# Patient Record
Sex: MALE | Race: WHITE | NOT HISPANIC OR LATINO | URBAN - METROPOLITAN AREA
[De-identification: names, ages, dates, MRNs, and addresses within clinical notes are randomized per-mention and may not be internally consistent; named-entity substitution may affect disease eponyms.]

---

## 2020-01-27 ENCOUNTER — OFFICE VISIT (OUTPATIENT)
Dept: OPHTHALMOLOGY | Facility: CLINIC | Age: 70
End: 2020-01-27
Attending: OPHTHALMOLOGY
Payer: COMMERCIAL

## 2020-01-27 DIAGNOSIS — H21.233 PIGMENT DISPERSION SYNDROME OF BOTH EYES: Primary | ICD-10-CM

## 2020-01-27 PROCEDURE — G0463 HOSPITAL OUTPT CLINIC VISIT: HCPCS | Mod: ZF

## 2020-01-27 PROCEDURE — 92015 DETERMINE REFRACTIVE STATE: CPT | Mod: GY,ZF

## 2020-01-27 PROCEDURE — 92083 EXTENDED VISUAL FIELD XM: CPT | Mod: ZF | Performed by: OPHTHALMOLOGY

## 2020-01-27 PROCEDURE — 92133 CPTRZD OPH DX IMG PST SGM ON: CPT | Mod: ZF | Performed by: OPHTHALMOLOGY

## 2020-01-27 RX ORDER — GABAPENTIN 300 MG/1
1 CAPSULE ORAL EVERY EVENING
COMMUNITY
Start: 2020-01-13

## 2020-01-27 ASSESSMENT — TONOMETRY
IOP_METHOD: APPLANATION
OS_IOP_MMHG: 18
OD_IOP_MMHG: 15

## 2020-01-27 ASSESSMENT — REFRACTION_MANIFEST
OS_CYLINDER: +0.25
OS_ADD: +2.50
OD_SPHERE: -1.50
OD_AXIS: 167
OS_SPHERE: -1.75
OD_ADD: +2.50
OS_AXIS: 040
OD_CYLINDER: +0.50

## 2020-01-27 ASSESSMENT — CONF VISUAL FIELD
OS_NORMAL: 1
OD_NORMAL: 1
METHOD: COUNTING FINGERS

## 2020-01-27 ASSESSMENT — VISUAL ACUITY
OD_CC+: +1
OD_CC: 20/40
OD_PH_CC: 20/25
METHOD: SNELLEN - LINEAR
OD_PH_CC+: -2
CORRECTION_TYPE: GLASSES
OS_CC: 20/20
OS_CC+: -2

## 2020-01-27 ASSESSMENT — SLIT LAMP EXAM - LIDS
COMMENTS: NORMAL
COMMENTS: NORMAL

## 2020-01-27 ASSESSMENT — REFRACTION_WEARINGRX
OD_SPHERE: -2.25
OS_SPHERE: -1.75
SPECS_TYPE: SVL
OS_ADD: +2.25
OD_ADD: +2.25
OS_CYLINDER: SPHERE
OD_CYLINDER: SPHERE

## 2020-01-27 ASSESSMENT — CUP TO DISC RATIO
OS_RATIO: 0.2
OD_RATIO: 0.3

## 2020-01-27 NOTE — NURSING NOTE
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  Complains of occasional blurriness BE.  Complains of occasional itchy sensation BE.  Denies any pain, pressure, discharge, and tearing.  Currently using Opcon-A PRN BE.    Jeanette Liang OT 9:15 AM January 27, 2020

## 2020-01-27 NOTE — PROGRESS NOTES
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      OCT normal. Initial GTOP both eyes, high false pos right eye, left eye with better reliability but paracentral decreased sensitivity.     RV 1 year   GTOP visual field and OCT retinal nerve fiber layer and dilation  Prescription for glasses per manifest refraction- updated Rx given    Attending Physician Attestation:  Complete documentation of historical and exam elements from today's encounter can be found in the full encounter summary report (not reduplicated in this progress note). I personally obtained the chief complaint(s) and history of present illness. I confirmed and edited asnecessary the review of systems, past medical/surgical history, family history, social history, and examination findings as documented by others; and I examined the patient myself. I personally reviewed the relevant tests, images, and reports as documented above. I formulated and edited as necessary the assessment and plan and discussed the findings and management plan with the patient and family.  - Salena Luna MD 11:01 AM 1/27/2020

## 2021-01-28 ENCOUNTER — TELEPHONE (OUTPATIENT)
Dept: OPHTHALMOLOGY | Facility: CLINIC | Age: 71
End: 2021-01-28

## 2022-11-17 DIAGNOSIS — H21.233 PIGMENT DISPERSION SYNDROME OF BOTH EYES: Primary | ICD-10-CM

## 2023-05-24 DIAGNOSIS — H21.233 PIGMENT DISPERSION SYNDROME OF BOTH EYES: Primary | ICD-10-CM

## 2023-05-24 DIAGNOSIS — H40.89 OTHER SPECIFIED GLAUCOMA: ICD-10-CM

## 2023-05-24 DIAGNOSIS — H53.8 OTHER VISUAL DISTURBANCES: ICD-10-CM

## 2023-05-24 DIAGNOSIS — H40.89 OTHER SPECIFIED GLAUCOMA, UNSPECIFIED LATERALITY: ICD-10-CM

## 2023-05-24 DIAGNOSIS — H53.453 OTHER LOCALIZED VISUAL FIELD DEFECT, BILATERAL: ICD-10-CM

## 2023-05-24 PROCEDURE — 99207 PACHYMETRY OU (BOTH EYES): CPT | Performed by: OPHTHALMOLOGY

## 2023-10-03 DIAGNOSIS — H40.89 OTHER SPECIFIED GLAUCOMA, UNSPECIFIED LATERALITY: ICD-10-CM

## 2023-10-03 DIAGNOSIS — H21.233 PIGMENT DISPERSION SYNDROME OF BOTH EYES: Primary | ICD-10-CM

## 2024-05-20 DIAGNOSIS — H21.233 PIGMENT DISPERSION SYNDROME OF BOTH EYES: Primary | ICD-10-CM

## 2024-05-20 DIAGNOSIS — H40.89 OTHER SPECIFIED GLAUCOMA, UNSPECIFIED LATERALITY: ICD-10-CM

## 2024-05-20 NOTE — PROGRESS NOTES
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 Previously followed with Dr. Luna. Last seen in January 2020. VF and RNFL were normal and he was being monitored with no glaucoma drops.     Patient states that he is interested in a comprehensive eye exam today. No specific concerns. No eye pain and not using any eye drops.     Drops on initial evaluation:    Relevant Past Medical/Family/Social History:  Crohns disease  hx basal cell carcinoma, insomnia  restless legs syndrome  Osteoarthritis  peripheral neuropathy     Relevant Review of Systems: none     Diagnosis: Pigmentary glaucoma , both eyes  Year diagnosis: 2015  Previous glaucoma surgery/laser: none  Maximum intraocular pressure 25/23 (5/21/24)  Current meds: see below  Family history: negative  CCT: 558/549  Gonio: open to ciliary body in all 4 quadrants in both eyes, Sampoelesi line  Refractive status: mild myope (sphere -2.5 and -1.5)  Trauma history: negative  Steroid exposure: has required prednisone at times for Crohn's but not recently  Marijuana use and frequency: no  Vasospastic disease: Migrane/Raynaud phenomenon: negative  A past hemodynamic crisis or Low BP:: negative  Meds AEs/intolerance: none to eye drops, see allergies in EMR  PMHx: Positive for: kidney stone in 2018 (golf ball size). Negative for : Asthma and respiratory problems/Cardiac/Renal/Sulfa Allergy  Anticoagulants: none  Previous testing:    Today's testing:  Visual field May 30, 2024:   Right eye - central defect involving the central visual axis, moderate reliable;   Left eye - Central defect involving the central vsiaul axis, moderate reliable  OCT Optic Nerve RNFL Spectralis May 30, 2024  right eye: normal thickness throughout. G89 from G90; stable from prior  left eye: normal thickness  throughout. G88 from G87. Stable from prior.    Additional Ocular History:   2. Mild NS each eye     3. Macular drusen each eye    Plan/Recommendations:  Discussed findings with patient. IOP today is elevated. No progression on RNFL. Does have new central visual field defect in both eyes.  Has drusen each eye and macular changes left eye, refer to retina   He is on vitamin supplements for Crohn's. Last B12 was 2021 and was normal. Normal color vision today. Possibly artifactual; will repeat at next visit.  Recommend starting latanoprost, at bedtime, both eyes  Repeat VF in 3-4 weeks and Consider MRI Brain if central defects persistent     RTC 4-6 weeks, VA, IOP, VF    Thank you for entrusting us with your care  Joseph Rodriguez MD, PGY3  Ophthalmology Resident  [facility]    Physician Attestation     Attending Physician Attestation:  Complete documentation of historical and exam elements from today's encounter can be found in the full encounter summary report (not reduplicated in this progress note). I personally obtained the chief complaint(s) and history of present illness. I confirmed and edited as necessary the review of systems, past medical/surgical history, family history, social history, and examination findings as documented by others; and I examined the patient myself. I personally reviewed the relevant tests, images, and reports as documented above. I personally reviewed the ophthalmic test(s) associated with this encounter, agree with the interpretation(s) as documented by the resident/fellow and have edited the corresponding report(s) as necessary. I formulated and edited as necessary the assessment and plan and discussed the findings and management plan with the patient and any family members present at the time of the visit.  Kaylee Short M.D., Glaucoma, May 21, 2024

## 2024-05-21 ENCOUNTER — OFFICE VISIT (OUTPATIENT)
Dept: OPHTHALMOLOGY | Facility: CLINIC | Age: 74
End: 2024-05-21
Attending: OPHTHALMOLOGY
Payer: COMMERCIAL

## 2024-05-21 DIAGNOSIS — H40.003 GLAUCOMA SUSPECT OF BOTH EYES: Primary | ICD-10-CM

## 2024-05-21 DIAGNOSIS — H35.363 DEGENERATIVE DRUSEN OF BOTH EYES: ICD-10-CM

## 2024-05-21 DIAGNOSIS — H21.233 PIGMENT DISPERSION SYNDROME OF BOTH EYES: ICD-10-CM

## 2024-05-21 PROCEDURE — G0463 HOSPITAL OUTPT CLINIC VISIT: HCPCS | Performed by: OPHTHALMOLOGY

## 2024-05-21 PROCEDURE — 99207 FUNDUS PHOTOS OU (BOTH EYES): CPT | Mod: 26 | Performed by: OPHTHALMOLOGY

## 2024-05-21 PROCEDURE — 92083 EXTENDED VISUAL FIELD XM: CPT | Performed by: OPHTHALMOLOGY

## 2024-05-21 PROCEDURE — 92015 DETERMINE REFRACTIVE STATE: CPT

## 2024-05-21 PROCEDURE — 92250 FUNDUS PHOTOGRAPHY W/I&R: CPT | Mod: 59 | Performed by: OPHTHALMOLOGY

## 2024-05-21 PROCEDURE — 99204 OFFICE O/P NEW MOD 45 MIN: CPT | Mod: GC | Performed by: OPHTHALMOLOGY

## 2024-05-21 PROCEDURE — 92133 CPTRZD OPH DX IMG PST SGM ON: CPT | Performed by: OPHTHALMOLOGY

## 2024-05-21 RX ORDER — CARVEDILOL 3.12 MG/1
3.12 TABLET ORAL
COMMUNITY
Start: 2024-01-04

## 2024-05-21 RX ORDER — NIACINAMIDE 500 MG
500 TABLET ORAL
COMMUNITY
Start: 2023-05-20

## 2024-05-21 RX ORDER — CYANOCOBALAMIN 1000 UG/ML
1000 INJECTION, SOLUTION INTRAMUSCULAR; SUBCUTANEOUS
COMMUNITY
Start: 2023-10-26 | End: 2024-10-20

## 2024-05-21 RX ORDER — ONDANSETRON 4 MG/1
4 TABLET, ORALLY DISINTEGRATING ORAL
COMMUNITY
Start: 2024-03-04

## 2024-05-21 RX ORDER — ALLOPURINOL 100 MG/1
50 TABLET ORAL
COMMUNITY
Start: 2023-09-28

## 2024-05-21 RX ORDER — LATANOPROST 50 UG/ML
1 SOLUTION/ DROPS OPHTHALMIC AT BEDTIME
Qty: 2.5 ML | Refills: 11 | Status: SHIPPED | OUTPATIENT
Start: 2024-05-21

## 2024-05-21 RX ORDER — VITAMIN B COMPLEX/FOLIC ACID 0.4 MG
1 TABLET ORAL DAILY
COMMUNITY
Start: 2022-09-29

## 2024-05-21 RX ORDER — AMLODIPINE BESYLATE 10 MG/1
10 TABLET ORAL DAILY
COMMUNITY
Start: 2023-09-28

## 2024-05-21 RX ORDER — OMEPRAZOLE 10 MG/1
10 CAPSULE, DELAYED RELEASE ORAL
COMMUNITY
Start: 2023-08-31

## 2024-05-21 ASSESSMENT — CONF VISUAL FIELD
OD_NORMAL: 1
OD_INFERIOR_NASAL_RESTRICTION: 0
OS_SUPERIOR_TEMPORAL_RESTRICTION: 0
METHOD: COUNTING FINGERS
OS_INFERIOR_TEMPORAL_RESTRICTION: 3
OD_INFERIOR_TEMPORAL_RESTRICTION: 0
OS_SUPERIOR_NASAL_RESTRICTION: 0
OS_INFERIOR_NASAL_RESTRICTION: 3
OD_SUPERIOR_NASAL_RESTRICTION: 0
OD_SUPERIOR_TEMPORAL_RESTRICTION: 0

## 2024-05-21 ASSESSMENT — VISUAL ACUITY
OS_CC: 20/20
OD_PH_CC: 20/25
OD_CC: 20/40
METHOD: SNELLEN - LINEAR
OD_CC+: -2
CORRECTION_TYPE: GLASSES
OS_CC+: -1

## 2024-05-21 ASSESSMENT — REFRACTION_MANIFEST
OD_SPHERE: -0.75
OD_CYLINDER: SPHERE
OS_CYLINDER: +0.25
OD_ADD: +1.75
OS_SPHERE: -1.50
OS_AXIS: 172
OS_ADD: +1.75

## 2024-05-21 ASSESSMENT — TONOMETRY
IOP_METHOD: APPLANATION
OD_IOP_MMHG: 19
OS_IOP_MMHG: 19
OD_IOP_MMHG: 25
IOP_METHOD: TONOPEN
OS_IOP_MMHG: 23

## 2024-05-21 ASSESSMENT — PACHYMETRY
OS_CT(UM): 549
OD_CT(UM): 558

## 2024-05-21 ASSESSMENT — REFRACTION_WEARINGRX
OS_SPHERE: -1.50
OD_CYLINDER: SPHERE
OD_SPHERE: +2.25
OS_AXIS: 106
OD_ADD: +1.75
OS_ADD: +1.75
SPECS_TYPE: PAL
OS_CYLINDER: +0.25

## 2024-05-21 ASSESSMENT — SLIT LAMP EXAM - LIDS
COMMENTS: NORMAL
COMMENTS: NORMAL

## 2024-05-21 ASSESSMENT — CUP TO DISC RATIO
OS_RATIO: 0.2
OD_RATIO: 0.3

## 2024-05-28 DIAGNOSIS — H21.233 PIGMENT DISPERSION SYNDROME OF BOTH EYES: Primary | ICD-10-CM

## 2024-05-28 DIAGNOSIS — H43.393 VITREOUS SYNERESIS OF BOTH EYES: ICD-10-CM

## 2024-06-19 DIAGNOSIS — H40.003 GLAUCOMA SUSPECT OF BOTH EYES: ICD-10-CM

## 2024-06-19 DIAGNOSIS — H21.233 PIGMENT DISPERSION SYNDROME OF BOTH EYES: Primary | ICD-10-CM

## 2024-09-01 DIAGNOSIS — H40.003 GLAUCOMA SUSPECT OF BOTH EYES: ICD-10-CM

## 2024-11-30 RX ORDER — LATANOPROST 50 UG/ML
1 SOLUTION/ DROPS OPHTHALMIC AT BEDTIME
Qty: 2.5 ML | Refills: 11 | OUTPATIENT
Start: 2024-11-30

## 2025-04-23 DIAGNOSIS — H40.003 GLAUCOMA SUSPECT OF BOTH EYES: Primary | ICD-10-CM

## 2025-05-25 DIAGNOSIS — H40.003 GLAUCOMA SUSPECT OF BOTH EYES: ICD-10-CM

## 2025-08-23 RX ORDER — LATANOPROST 50 UG/ML
1 SOLUTION/ DROPS OPHTHALMIC AT BEDTIME
Qty: 2.5 ML | Refills: 11 | OUTPATIENT
Start: 2025-08-23